# Patient Record
Sex: FEMALE | Race: BLACK OR AFRICAN AMERICAN | Employment: STUDENT | URBAN - METROPOLITAN AREA
[De-identification: names, ages, dates, MRNs, and addresses within clinical notes are randomized per-mention and may not be internally consistent; named-entity substitution may affect disease eponyms.]

---

## 2018-08-30 PROBLEM — J45.909 ASTHMA: Status: ACTIVE | Noted: 2018-08-30

## 2018-09-05 PROBLEM — F32.A DEPRESSION (EMOTION): Status: ACTIVE | Noted: 2018-09-05

## 2019-05-20 PROBLEM — I10 HYPERTENSION: Status: ACTIVE | Noted: 2019-05-20

## 2022-03-18 PROBLEM — I10 HYPERTENSION: Status: ACTIVE | Noted: 2019-05-20

## 2022-03-18 PROBLEM — F32.0 DEPRESSION, MAJOR, SINGLE EPISODE, MILD (HCC): Status: ACTIVE | Noted: 2019-04-01

## 2022-03-19 PROBLEM — F41.9 ANXIETY: Status: ACTIVE | Noted: 2020-04-22

## 2022-03-19 PROBLEM — J45.909 ASTHMA: Status: ACTIVE | Noted: 2018-08-30

## 2023-03-14 ENCOUNTER — OFFICE VISIT (OUTPATIENT)
Dept: PRIMARY CARE CLINIC | Age: 28
End: 2023-03-14
Payer: MEDICAID

## 2023-03-14 VITALS
HEIGHT: 61 IN | OXYGEN SATURATION: 97 % | HEART RATE: 72 BPM | SYSTOLIC BLOOD PRESSURE: 129 MMHG | TEMPERATURE: 97.5 F | DIASTOLIC BLOOD PRESSURE: 85 MMHG | WEIGHT: 165.2 LBS | RESPIRATION RATE: 18 BRPM | BODY MASS INDEX: 31.19 KG/M2

## 2023-03-14 DIAGNOSIS — F32.0 DEPRESSION, MAJOR, SINGLE EPISODE, MILD (HCC): Primary | ICD-10-CM

## 2023-03-14 DIAGNOSIS — G35 MULTIPLE SCLEROSIS (HCC): ICD-10-CM

## 2023-03-14 DIAGNOSIS — I10 PRIMARY HYPERTENSION: ICD-10-CM

## 2023-03-14 PROCEDURE — 3079F DIAST BP 80-89 MM HG: CPT | Performed by: FAMILY MEDICINE

## 2023-03-14 PROCEDURE — 3074F SYST BP LT 130 MM HG: CPT | Performed by: FAMILY MEDICINE

## 2023-03-14 PROCEDURE — 99214 OFFICE O/P EST MOD 30 MIN: CPT | Performed by: FAMILY MEDICINE

## 2023-03-14 RX ORDER — ACETAMINOPHEN AND CODEINE PHOSPHATE 120; 12 MG/5ML; MG/5ML
SOLUTION ORAL
COMMUNITY

## 2023-03-14 RX ORDER — LABETALOL 100 MG/1
TABLET, FILM COATED ORAL 2 TIMES DAILY
COMMUNITY
End: 2023-03-14 | Stop reason: SDUPTHER

## 2023-03-14 RX ORDER — NIFEDIPINE 30 MG/1
30 TABLET, FILM COATED, EXTENDED RELEASE ORAL DAILY
Qty: 30 TABLET | Refills: 1 | Status: SHIPPED | OUTPATIENT
Start: 2023-03-14

## 2023-03-14 RX ORDER — LABETALOL 100 MG/1
200 TABLET, FILM COATED ORAL 2 TIMES DAILY
Qty: 60 TABLET | Refills: 2 | Status: SHIPPED | OUTPATIENT
Start: 2023-03-14

## 2023-03-14 RX ORDER — SERTRALINE HYDROCHLORIDE 100 MG/1
200 TABLET, FILM COATED ORAL DAILY
COMMUNITY

## 2023-03-14 NOTE — PROGRESS NOTES
Chief Complaint   Patient presents with    Establish Care    Depression     Visit Vitals  /85 (BP 1 Location: Left upper arm, BP Patient Position: Sitting, BP Cuff Size: Small adult)   Pulse 72   Temp 97.5 °F (36.4 °C) (Temporal)   Resp 18   Ht 5' 1\" (1.549 m)   Wt 165 lb 3.2 oz (74.9 kg)   SpO2 97%   BMI 31.21 kg/m²     1. \"Have you been to the ER, urgent care clinic since your last visit? Hospitalized since your last visit? \" no    2. \"Have you seen or consulted any other health care providers outside of the [address] [place] [address] since your last visit? \" Neurology for MS         5.  For patients aged 21-65: Has the patient had a pap smear? 02/01/22 Ghanshyam yanes MD

## 2023-03-14 NOTE — PROGRESS NOTES
HPI     Chief Complaint   Patient presents with    [facility]    Depression     She is a 33 y.o. female who presents to [facility]. Establishing Care    Pmhx : MS, HTN, asthma, anxiety and depression    MS, following with neuro. Recent dx. Depression and anxiety. Has been on zoloft for > 1 year. Increased to 200mg daily due to pp depression. Has 11 month old baby girl at home. She is breastfeeding. Currently staying at home with her baby girl. She walks for exercise about 2 days per week. Admits unhealthy diet. Caffeine intake is 1-2 servings per day. Alcohol intake is about 5 drinks per week. Denies drug use. She has good social support. Recently relocated back to [place] [facility] from [place]. Was previously follow by psychiatry in [place]. Denies thoughts of self harm. HTN. On labetolol 300mg BID. She increased her dose on her own due to elevated bp at home. Intolerant of hctz and amlodipine in the past due to adverse effects.    - Chronic medical problems:  Past Medical History:   Diagnosis Date    Anxiety     Asthma     Depressive disorder     Environmental allergies     Hypertension      - Current medications:   Current Outpatient Medications   Medication Sig    PNV Comb #2-Iron-FA-Omega 3 29-1-400 mg cmpk Take  by mouth. sertraline (ZOLOFT) 100 mg tablet Take 200 mg by mouth daily. labetaloL (NORMODYNE) 100 mg tablet Take  by mouth two (2) times a day. norethindrone (Ana-BE) 0.35 mg tab Take  by mouth. B.animalis,bifid,infantis,long 10-15 mg TbEC Take  by mouth.    propranoloL (INDERAL) 10 mg tablet TAKE 1 TABLET BY MOUTH TWICE A DAY    amLODIPine (NORVASC) 5 mg tablet TAKE 1 TABLET BY MOUTH EVERY DAY    sertraline (Zoloft) 100 mg tablet Take 100 mg by mouth daily. hydrOXYzine pamoate (VISTARIL) 25 mg capsule Take 25 mg by mouth two (2) times a day.  (Patient not taking: Reported on 3/14/2023)    Chasidy Moy 1.5/30, 21, 1.5-30 mg-mcg tab TAKE 1 TABLET(S) EVERY DAY BY ORAL ROUTE.    multivit,calc,mins/iron/folic (ONE-A-DAY WOMENS FORMULA PO) Take  by mouth. (Patient not taking: Reported on 3/14/2023)    EPINEPHrine (EPIPEN) 0.3 mg/0.3 mL injection 0.3 mg by IntraMUSCular route as needed for Anaphylaxis. (Patient not taking: Reported on 3/14/2023)     No current facility-administered medications for this visit. - Family history:   Family History   Problem Relation Age of Onset    Pulmonary Embolism Mother     Hypertension Father     No Known Problems Sister     No Known Problems Brother     Diabetes Maternal Grandmother     Cancer Maternal Grandmother     Kidney Disease Maternal Grandmother     Schizophrenia Paternal Grandmother     Alzheimer's Disease Paternal Grandfather     Breast Cancer Neg Hx      - Allergies: Allergies   Allergen Reactions    Nut - Unspecified Anaphylaxis    Shellfish Derived Anaphylaxis     - Surgical history:   Past Surgical History:   Procedure Laterality Date    HX WISDOM TEETH EXTRACTION Bilateral 01/2020     - Social history (sexually active, occupation, smoker, etoh use, etc):   Social History     Socioeconomic History    Marital status:      Spouse name: Not on file    Number of children: Not on file    Years of education: Not on file    Highest education level: Not on file   Occupational History    Not on file   Tobacco Use    Smoking status: Never    Smokeless tobacco: Never   Vaping Use    Vaping Use: Never used   Substance and Sexual Activity    Alcohol use:  Yes     Alcohol/week: 1.0 standard drink     Types: 1 Glasses of wine per week     Comment: socially     Drug use: No    Sexual activity: Yes     Birth control/protection: Pill   Other Topics Concern    Not on file   Social History Narrative    ** Merged History Encounter **          Social Determinants of Health     Financial Resource Strain: Not on file   Food Insecurity: Not on file   Transportation Needs: Not on file   Physical Activity: Insufficiently Active    Days of Exercise per Week: 2 days    Minutes of Exercise per Session: 60 min   Stress: Not on file   Social Connections: Not on file   Intimate Partner Violence: Not At Risk    Fear of Current or Ex-Partner: No    Emotionally Abused: No    Physically Abused: No    Sexually Abused: No   Housing Stability: Not on file         Review of Systems  General : Denies fever, chills, unintentional weight loss. Cardiac : Denies chest pain, shortness of breath, orthopnea, PND. Pulm : Denies coughing, hemoptysis, dyspnea. GI: Denies abd pain, vomiting, change in bowel movements, black/bloody stool. Neuro : Denies syncope or presyncope. Denies focal neuro symptoms. Reviewed PmHx, RxHx, FmHx, SocHx, AllgHx and updated and dated in the chart. Physical Exam:  Visit Vitals  /85 (BP 1 Location: Left upper arm, BP Patient Position: Sitting, BP Cuff Size: Small adult)   Pulse 72   Temp 97.5 °F (36.4 °C) (Temporal)   Resp 18   Ht 5' 1\" (1.549 m)   Wt 165 lb 3.2 oz (74.9 kg)   SpO2 97%   BMI 31.21 kg/m²     Physical Exam  Vitals reviewed. Constitutional:       Appearance: Normal appearance. Eyes:      Conjunctiva/sclera: Conjunctivae normal.      Pupils: Pupils are equal, round, and reactive to light. Cardiovascular:      Pulses: Normal pulses. Heart sounds: Normal heart sounds. Pulmonary:      Effort: Pulmonary effort is normal.      Breath sounds: Normal breath sounds. Musculoskeletal:      Right lower leg: No edema. Left lower leg: No edema. Skin:     General: Skin is warm and dry. Neurological:      General: No focal deficit present. Mental Status: She is alert and oriented to person, place, and time. Psychiatric:         Behavior: Behavior normal.         Thought Content: Thought content normal.      Comments: Tearful at times. No psychomotor agitation or retardation. Speech is normal. Good eye contact. Assessment / Plan     Diagnoses and all orders for this visit:    1.  Depression, major, single episode, mild ([facility] [address]    2. Primary hypertension    3. Multiple sclerosis ([facility] [address]    Other orders  -     NIFEdipine ER (ADALAT CC) 30 mg ER tablet; Take 1 Tablet by mouth daily. -     labetaloL (NORMODYNE) 100 mg tablet; Take 2 Tablets by mouth two (2) times a day. Due to concern about SE of fatigue and depressed mood, May decr labetolol and add nifedipine. Monitor bp at home. Follow up 3-4 weeks or sooner prn. Recent labs and notes reviwed. Encouraged her to see a behavioral health counselor ; referrals provided. Encouraged social support, regular exercise, healthy diet. Obtain medical records    I have discussed the diagnosis with the patient and the intended plan as seen in the above orders. I have discussed medication side effects and warnings with the patient as well.     Lars Arenas, DO

## 2023-03-28 RX ORDER — SERTRALINE HYDROCHLORIDE 100 MG/1
200 TABLET, FILM COATED ORAL DAILY
Qty: 180 TABLET | Refills: 0 | Status: SHIPPED | OUTPATIENT
Start: 2023-03-28 | End: 2023-06-26

## 2023-05-09 RX ORDER — NIFEDIPINE 30 MG/1
30 TABLET, FILM COATED, EXTENDED RELEASE ORAL DAILY
Qty: 30 TABLET | Refills: 0 | Status: SHIPPED | OUTPATIENT
Start: 2023-05-09 | End: 2023-06-02

## 2023-06-01 DIAGNOSIS — I15.9 SECONDARY HYPERTENSION: Primary | ICD-10-CM

## 2023-06-02 RX ORDER — NIFEDIPINE 30 MG/1
TABLET, FILM COATED, EXTENDED RELEASE ORAL
Qty: 90 TABLET | Refills: 0 | Status: SHIPPED | OUTPATIENT
Start: 2023-06-02

## 2023-08-03 ENCOUNTER — PATIENT MESSAGE (OUTPATIENT)
Dept: PRIMARY CARE CLINIC | Facility: CLINIC | Age: 28
End: 2023-08-03

## 2023-08-10 RX ORDER — ACETAMINOPHEN AND CODEINE PHOSPHATE 120; 12 MG/5ML; MG/5ML
1 SOLUTION ORAL DAILY
Qty: 84 TABLET | Refills: 3 | Status: SHIPPED | OUTPATIENT
Start: 2023-08-10

## 2023-08-10 NOTE — TELEPHONE ENCOUNTER
From: Deshawn Pearl  To: Dr. Kiko Cota: 8/3/2023 7:29 PM EDT  Subject: Norethindrone 0.35    Hello! May I have my prescription for Norethindrone 0.35 refilled at the [facility] at the [facility], please? Thank you! negative Regular rate & rhythm, normal S1, S2; no murmurs, gallops or rubs; no S3, S4

## 2023-10-25 DIAGNOSIS — I15.9 SECONDARY HYPERTENSION: ICD-10-CM

## 2023-10-26 RX ORDER — NIFEDIPINE 30 MG/1
TABLET, FILM COATED, EXTENDED RELEASE ORAL
Qty: 90 TABLET | Refills: 0 | Status: SHIPPED | OUTPATIENT
Start: 2023-10-26 | End: 2023-10-27 | Stop reason: SDUPTHER

## 2023-10-27 ENCOUNTER — OFFICE VISIT (OUTPATIENT)
Dept: PRIMARY CARE CLINIC | Facility: CLINIC | Age: 28
End: 2023-10-27

## 2023-10-27 VITALS
HEART RATE: 72 BPM | DIASTOLIC BLOOD PRESSURE: 81 MMHG | RESPIRATION RATE: 16 BRPM | WEIGHT: 160.6 LBS | TEMPERATURE: 97.6 F | OXYGEN SATURATION: 97 % | BODY MASS INDEX: 30.32 KG/M2 | SYSTOLIC BLOOD PRESSURE: 128 MMHG | HEIGHT: 61 IN

## 2023-10-27 DIAGNOSIS — Z13.1 SCREENING FOR DIABETES MELLITUS: ICD-10-CM

## 2023-10-27 DIAGNOSIS — E66.9 OBESITY (BMI 30-39.9): ICD-10-CM

## 2023-10-27 DIAGNOSIS — Z23 NEED FOR IMMUNIZATION AGAINST INFLUENZA: Primary | ICD-10-CM

## 2023-10-27 DIAGNOSIS — I15.9 SECONDARY HYPERTENSION: ICD-10-CM

## 2023-10-27 DIAGNOSIS — Z00.00 WELL WOMAN EXAM (NO GYNECOLOGICAL EXAM): ICD-10-CM

## 2023-10-27 DIAGNOSIS — Z13.220 SCREENING FOR HYPERLIPIDEMIA: ICD-10-CM

## 2023-10-27 RX ORDER — EPINEPHRINE 0.3 MG/.3ML
0.3 INJECTION SUBCUTANEOUS PRN
Qty: 2 EACH | Refills: 3 | Status: SHIPPED | OUTPATIENT
Start: 2023-10-27

## 2023-10-27 RX ORDER — MIRTAZAPINE 15 MG/1
15 TABLET, FILM COATED ORAL NIGHTLY
COMMUNITY

## 2023-10-27 RX ORDER — PROPRANOLOL HYDROCHLORIDE 10 MG/1
10 TABLET ORAL 3 TIMES DAILY
COMMUNITY

## 2023-10-27 RX ORDER — NIFEDIPINE 30 MG/1
30 TABLET, FILM COATED, EXTENDED RELEASE ORAL DAILY
Qty: 90 TABLET | Refills: 3 | Status: SHIPPED | OUTPATIENT
Start: 2023-10-27

## 2023-10-27 RX ORDER — ALBUTEROL SULFATE 90 UG/1
2 AEROSOL, METERED RESPIRATORY (INHALATION) 4 TIMES DAILY PRN
Qty: 54 G | Refills: 1 | Status: SHIPPED | OUTPATIENT
Start: 2023-10-27

## 2023-10-27 SDOH — ECONOMIC STABILITY: HOUSING INSECURITY
IN THE LAST 12 MONTHS, WAS THERE A TIME WHEN YOU DID NOT HAVE A STEADY PLACE TO SLEEP OR SLEPT IN A SHELTER (INCLUDING NOW)?: PATIENT REFUSED

## 2023-10-27 SDOH — ECONOMIC STABILITY: FOOD INSECURITY: WITHIN THE PAST 12 MONTHS, YOU WORRIED THAT YOUR FOOD WOULD RUN OUT BEFORE YOU GOT MONEY TO BUY MORE.: PATIENT DECLINED

## 2023-10-27 SDOH — ECONOMIC STABILITY: FOOD INSECURITY: WITHIN THE PAST 12 MONTHS, THE FOOD YOU BOUGHT JUST DIDN'T LAST AND YOU DIDN'T HAVE MONEY TO GET MORE.: PATIENT DECLINED

## 2023-10-27 SDOH — ECONOMIC STABILITY: INCOME INSECURITY: HOW HARD IS IT FOR YOU TO PAY FOR THE VERY BASICS LIKE FOOD, HOUSING, MEDICAL CARE, AND HEATING?: PATIENT DECLINED

## 2023-10-27 ASSESSMENT — PATIENT HEALTH QUESTIONNAIRE - PHQ9
1. LITTLE INTEREST OR PLEASURE IN DOING THINGS: 1
SUM OF ALL RESPONSES TO PHQ QUESTIONS 1-9: 2
SUM OF ALL RESPONSES TO PHQ9 QUESTIONS 1 & 2: 2
2. FEELING DOWN, DEPRESSED OR HOPELESS: 1
SUM OF ALL RESPONSES TO PHQ QUESTIONS 1-9: 2

## 2023-10-27 NOTE — PROGRESS NOTES
Rebecca Quinn is an 33 y.o. female who presents for wellness exam.     Pmhx : MS, HTN, asthma, anxiety and depression     MS, following with neuro. Recent dx. Recently started ocrevus infusions. Depression and anxiety. She is now following with behavioral health. Starting mirtazapine to stimulate her appetite. Started propranolol to help with anxiety. HTN. Intolerant of hctz and amlodipine in the past due to adverse effects. On nifedipine. Allergies - reviewed: Allergies   Allergen Reactions    Shellfish Allergy Anaphylaxis         Medications - reviewed:   Current Outpatient Medications   Medication Sig    NIFEdipine (ADALAT CC) 30 MG extended release tablet TAKE 1 TABLET BY MOUTH EVERY DAY    norethindrone (MICRONOR) 0.35 MG tablet Take 1 tablet by mouth daily    EPINEPHrine (EPIPEN) 0.3 MG/0.3ML SOAJ injection Inject 0.3 mg into the muscle as needed    labetalol (NORMODYNE) 100 MG tablet Take 200 mg by mouth 2 times daily    sertraline (ZOLOFT) 100 MG tablet Take 200 mg by mouth daily     No current facility-administered medications for this visit. Past Medical History - reviewed:  Past Medical History:   Diagnosis Date    Anxiety     Asthma     Depressive disorder     Environmental allergies     Hypertension          Past Surgical History - reviewed:   Past Surgical History:   Procedure Laterality Date    WISDOM TOOTH EXTRACTION Bilateral 01/2020         Social History - reviewed:  Social History     Socioeconomic History    Marital status:      Spouse name: Not on file    Number of children: Not on file    Years of education: Not on file    Highest education level: Not on file   Occupational History    Not on file   Tobacco Use    Smoking status: Never    Smokeless tobacco: Never   Substance and Sexual Activity    Alcohol use:  Yes     Alcohol/week: 1.0 standard drink of alcohol    Drug use: No    Sexual activity: Not on file   Other Topics Concern    Not on file

## 2023-11-09 RX ORDER — SERTRALINE HYDROCHLORIDE 100 MG/1
200 TABLET, FILM COATED ORAL DAILY
Qty: 180 TABLET | OUTPATIENT
Start: 2023-11-09

## 2023-12-12 RX ORDER — SERTRALINE HYDROCHLORIDE 100 MG/1
200 TABLET, FILM COATED ORAL DAILY
Qty: 180 TABLET | Refills: 1 | Status: SHIPPED | OUTPATIENT
Start: 2023-12-12

## 2024-03-08 DIAGNOSIS — I15.9 SECONDARY HYPERTENSION: ICD-10-CM

## 2024-03-08 RX ORDER — NIFEDIPINE 30 MG
30 TABLET, EXTENDED RELEASE ORAL DAILY
Qty: 90 TABLET | Refills: 0 | Status: SHIPPED | OUTPATIENT
Start: 2024-03-08

## 2024-03-08 NOTE — TELEPHONE ENCOUNTER
PCP: Ashleigh Giraldo DO    Last Visit 10/27/2023   No future appointments.    Requested Prescriptions     Pending Prescriptions Disp Refills    NIFEdipine (ADALAT CC) 30 MG extended release tablet 90 tablet 3     Sig: Take 1 tablet by mouth daily         Other Comments: Last Refill

## 2024-04-11 DIAGNOSIS — I15.9 SECONDARY HYPERTENSION: ICD-10-CM

## 2024-04-11 RX ORDER — NIFEDIPINE 30 MG
30 TABLET, EXTENDED RELEASE ORAL DAILY
Qty: 90 TABLET | Refills: 0 | Status: SHIPPED | OUTPATIENT
Start: 2024-04-11

## 2024-10-11 DIAGNOSIS — I15.9 SECONDARY HYPERTENSION: ICD-10-CM

## 2024-10-11 RX ORDER — NIFEDIPINE 30 MG
TABLET, EXTENDED RELEASE ORAL
Qty: 90 TABLET | Refills: 0 | OUTPATIENT
Start: 2024-10-11

## 2024-10-11 NOTE — TELEPHONE ENCOUNTER
PCP: Ashleigh Giraldo DO    Last Visit 10/27/2023   No future appointments.    Requested Prescriptions     Pending Prescriptions Disp Refills    NIFEdipine (ADALAT CC) 30 MG extended release tablet [Pharmacy Med Name: NIFEDIPINE ER 30 MG TABLET] 90 tablet 0     Sig: TAKE 1 TABLET BY MOUTH DAILY DUE FOLLOW UP         Other Comments: Last Refill   04/11/24